# Patient Record
Sex: FEMALE | URBAN - METROPOLITAN AREA
[De-identification: names, ages, dates, MRNs, and addresses within clinical notes are randomized per-mention and may not be internally consistent; named-entity substitution may affect disease eponyms.]

---

## 2021-03-23 ENCOUNTER — HOSPITAL ENCOUNTER (EMERGENCY)
Facility: CLINIC | Age: 21
End: 2021-03-23

## 2021-03-23 VITALS
RESPIRATION RATE: 16 BRPM | TEMPERATURE: 97.8 F | OXYGEN SATURATION: 100 % | WEIGHT: 150 LBS | SYSTOLIC BLOOD PRESSURE: 136 MMHG | HEIGHT: 63 IN | HEART RATE: 59 BPM | DIASTOLIC BLOOD PRESSURE: 69 MMHG | BODY MASS INDEX: 26.58 KG/M2

## 2021-03-23 ASSESSMENT — MIFFLIN-ST. JEOR: SCORE: 1414.53

## 2021-03-23 NOTE — ED TRIAGE NOTES
Pt reports waking with a HA and then had a sudden onset of dizziness, vision changes, and nausea around 11am. Mother notes pt having a hx of a stroke when she was 12 y.o.